# Patient Record
Sex: FEMALE | Race: WHITE | Employment: FULL TIME | URBAN - METROPOLITAN AREA
[De-identification: names, ages, dates, MRNs, and addresses within clinical notes are randomized per-mention and may not be internally consistent; named-entity substitution may affect disease eponyms.]

---

## 2021-10-06 ENCOUNTER — OFFICE VISIT (OUTPATIENT)
Dept: URGENT CARE | Facility: URGENT CARE | Age: 51
End: 2021-10-06
Payer: COMMERCIAL

## 2021-10-06 ENCOUNTER — ANCILLARY PROCEDURE (OUTPATIENT)
Dept: GENERAL RADIOLOGY | Facility: CLINIC | Age: 51
End: 2021-10-06
Attending: PHYSICIAN ASSISTANT
Payer: COMMERCIAL

## 2021-10-06 VITALS
OXYGEN SATURATION: 100 % | HEART RATE: 68 BPM | RESPIRATION RATE: 20 BRPM | DIASTOLIC BLOOD PRESSURE: 62 MMHG | TEMPERATURE: 98.4 F | SYSTOLIC BLOOD PRESSURE: 116 MMHG | WEIGHT: 145 LBS

## 2021-10-06 DIAGNOSIS — M79.674 PAIN OF TOE OF RIGHT FOOT: ICD-10-CM

## 2021-10-06 DIAGNOSIS — S92.514A CLOSED NONDISPLACED FRACTURE OF PROXIMAL PHALANX OF LESSER TOE OF RIGHT FOOT, INITIAL ENCOUNTER: Primary | ICD-10-CM

## 2021-10-06 PROCEDURE — 99204 OFFICE O/P NEW MOD 45 MIN: CPT | Performed by: PHYSICIAN ASSISTANT

## 2021-10-06 PROCEDURE — 73660 X-RAY EXAM OF TOE(S): CPT | Mod: RT | Performed by: RADIOLOGY

## 2021-10-06 RX ORDER — BUPROPION HYDROCHLORIDE 300 MG/1
300 TABLET ORAL
COMMUNITY
Start: 2021-06-02

## 2021-10-06 RX ORDER — GABAPENTIN 600 MG/1
600 TABLET ORAL
COMMUNITY
Start: 2021-09-16

## 2021-10-06 RX ORDER — GABAPENTIN 100 MG/1
CAPSULE ORAL
COMMUNITY
Start: 2021-09-20

## 2021-10-06 RX ORDER — ZOLPIDEM TARTRATE 5 MG/1
TABLET ORAL
COMMUNITY
Start: 2021-08-19

## 2021-10-06 RX ORDER — HYDROXYZINE HYDROCHLORIDE 10 MG/1
10 TABLET, FILM COATED ORAL EVERY 8 HOURS PRN
COMMUNITY
Start: 2020-05-11

## 2021-10-06 RX ORDER — ESCITALOPRAM OXALATE 20 MG/1
20 TABLET ORAL
COMMUNITY
Start: 2021-06-02

## 2021-10-07 NOTE — PATIENT INSTRUCTIONS
Patient Education     Closed Toe Fracture  Your toe is broken (fractured). This causes pain, swelling, and sometimes bruising. This injury usually takes about 4 to 6 weeks to heal, but can sometimes take longer. Toe injuries are often treated by taping the injured toe to the next one (buddy taping). Or you may have a hard shoe, splint, or cast. These protect the injured toe and hold it in position.   If the toenail has been severely injured, it may fall off in 1 to 2 weeks. It takes up to 12 months for a new toenail to grow back.   Home care  Follow these guidelines when caring for yourself at home:    You may be given a cast shoe to wear to keep your toe from moving. If not, you can use a sandal or any shoe that doesn t put pressure on the injured toe until the swelling and pain go away. If using a sandal, be careful not to strike your foot against anything. Another injury could make the fracture worse. If you were given crutches, don t put full weight on the injured foot until you can do so without pain, or as directed by your healthcare provider.    Keep your foot elevated to reduce pain and swelling. When sleeping, put a pillow under the injured leg. When sitting, support the injured leg so it is above your waist. This is very important during the first 2 days (48 hours).    Put an ice pack on the injured area. Do this for 20 minutes every 1 to 2 hours the first day for pain relief. You can make an ice pack by wrapping a plastic bag of ice cubes in a thin towel. As the ice melts, be careful that any cloth or paper tape doesn t get wet. Continue using the ice pack 3 to 4 times a day for the next 2 days. Then use the ice pack as needed to ease pain and swelling.    If buddy tape was used and it becomes wet or dirty, change it. You may replace it with paper, plastic, or cloth tape. Cloth tape and paper tapes must be kept dry.    You may use acetaminophen or ibuprofen to control pain, unless another pain medicine  was prescribed. If you have chronic liver or kidney disease, talk with your healthcare provider before using these medicines. Also talk with your provider if you ve had a stomach ulcer or gastrointestinal bleeding.    You may return to sports or physical education activities after 4 weeks when you can run without pain, or as directed by your healthcare provider.  Follow-up care  Follow up with your healthcare provider or as advised. This is to make sure the bone is healing the way it should.   X-rays may be taken. You will be told of any new findings that may affect your care.  When to seek medical advice  Call your healthcare provider right away if any of these occur:    Pain or swelling gets worse    The cast/splint cracks    The cast and padding get wet and stays wet more than 24 hours    Bad odor from the cast/splint or wound fluid stains the cast    Tightness or pressure under the cast/splint gets worse    Toe becomes cold, blue, numb, or tingly    You can t move the toe    Signs of infection: fever, redness, warmth, swelling, or drainage from the wound or cast    Fever of 100.4 F (38 C) or higher, or as directed by your healthcare provider    Diamond Fournier last reviewed this educational content on 2/1/2020 2000-2021 [employer]. All rights reserved. This information is not intended as a substitute for professional medical care. Always follow your healthcare professional's instructions.            no

## 2021-10-12 ENCOUNTER — OFFICE VISIT (OUTPATIENT)
Dept: PODIATRY | Facility: CLINIC | Age: 51
End: 2021-10-12
Attending: PHYSICIAN ASSISTANT
Payer: COMMERCIAL

## 2021-10-12 VITALS — WEIGHT: 145 LBS

## 2021-10-12 DIAGNOSIS — M79.671 ACUTE PAIN OF RIGHT FOOT: Primary | ICD-10-CM

## 2021-10-12 DIAGNOSIS — S92.514A CLOSED NONDISPLACED FRACTURE OF PROXIMAL PHALANX OF LESSER TOE OF RIGHT FOOT, INITIAL ENCOUNTER: ICD-10-CM

## 2021-10-12 PROCEDURE — 99243 OFF/OP CNSLTJ NEW/EST LOW 30: CPT | Performed by: PODIATRIST

## 2021-10-12 NOTE — PROGRESS NOTES
PATIENT HISTORY:  Sandeep Lockett PA-C requested I see this patient for their foot issue.  Esther Fabian is a 51 year old female who presents to clinic for pain to the right third toe.  Patient notes about a week ago was running and accidentally hit her foot into a wall.  She had instant pain.  Was seen in urgent care and had x-rays.  She was told she had a fracture.  They have been taking it to the point told.  Pain at its worst can be 5 out of 10.  She has been doing Tylenol resting and elevating which has been helping.  If there is too much bending at the toe that is what is the most painful.  She is wondering if she needs surgery or what else can be done for the toe fracture.    Review of Systems:  Patient denies fever, chills, rash, wound, stiffness,  numbness, weakness, heart burn, blood in stool, chest pain with activity, calf pain when walking, shortness of breath with activity, chronic cough, easy bleeding/bruising, swelling of ankles, excessive thirst, fatigue, depression, anxiety.  Patient admits to limping at times.     PAST MEDICAL HISTORY: No past medical history on file.     PAST SURGICAL HISTORY: No past surgical history on file.     MEDICATIONS:   Current Outpatient Medications:      buPROPion (WELLBUTRIN XL) 300 MG 24 hr tablet, Take 300 mg by mouth, Disp: , Rfl:      diclofenac (VOLTAREN) 50 MG EC tablet, Take 50 mg by mouth (Patient not taking: Reported on 10/6/2021), Disp: , Rfl:      escitalopram (LEXAPRO) 20 MG tablet, Take 20 mg by mouth, Disp: , Rfl:      gabapentin (NEURONTIN) 100 MG capsule, PLEASE SEE ATTACHED FOR DETAILED DIRECTIONS, Disp: , Rfl:      gabapentin (NEURONTIN) 600 MG tablet, Take 600 mg by mouth, Disp: , Rfl:      hydrOXYzine (ATARAX) 10 MG tablet, Take 10 mg by mouth every 8 hours as needed , Disp: , Rfl:      zolpidem (AMBIEN) 5 MG tablet, TAKE 1 TABLET AT BEDTIME THE NIGHT OF YOUR SLEEP STUDY (Patient not taking: Reported on 10/6/2021), Disp: , Rfl:      ALLERGIES:  No  Known Allergies     SOCIAL HISTORY:   Social History     Socioeconomic History     Marital status: Single     Spouse name: Not on file     Number of children: Not on file     Years of education: Not on file     Highest education level: Not on file   Occupational History     Not on file   Tobacco Use     Smoking status: Never Smoker     Smokeless tobacco: Never Used   Substance and Sexual Activity     Alcohol use: Yes     Drug use: Never     Sexual activity: Not on file   Other Topics Concern     Not on file   Social History Narrative     Not on file     Social Determinants of Health     Financial Resource Strain:      Difficulty of Paying Living Expenses:    Food Insecurity:      Worried About Running Out of Food in the Last Year:      Ran Out of Food in the Last Year:    Transportation Needs:      Lack of Transportation (Medical):      Lack of Transportation (Non-Medical):    Physical Activity:      Days of Exercise per Week:      Minutes of Exercise per Session:    Stress:      Feeling of Stress :    Social Connections:      Frequency of Communication with Friends and Family:      Frequency of Social Gatherings with Friends and Family:      Attends Christian Services:      Active Member of Clubs or Organizations:      Attends Club or Organization Meetings:      Marital Status:    Intimate Partner Violence:      Fear of Current or Ex-Partner:      Emotionally Abused:      Physically Abused:      Sexually Abused:         FAMILY HISTORY: No family history on file.     EXAM:Vitals: Wt 65.8 kg (145 lb)       General appearance: Patient is alert and fully cooperative with history & exam.  No sign of distress is noted during the visit.     Psychiatric: Affect is pleasant & appropriate.  Patient appears motivated to improve health.     Respiratory: Breathing is regular & unlabored while sitting.     HEENT: Hearing is intact to spoken word.  Speech is clear.  No gross evidence of visual impairment that would impact  ambulation.     Dermatologic: Skin is intact to both lower extremities without significant lesions, rash or abrasion.  No paronychia or evidence of soft tissue infection is noted.     Vascular: DP & PT pulses are intact & regular bilaterally.  No significant edema or varicosities noted.  CFT and skin temperature is normal to both lower extremities.     Neurologic: Lower extremity sensation is intact to light touch.  No evidence of weakness or contracture in the lower extremities.  No evidence of neuropathy.     Musculoskeletal: Patient is ambulatory without assistive device or brace.  Edema right third toe.  There is some lateral deviation of the toe noted.  No prominent bone or areas of skin concerns from the fracture noted    Radiographs: right foot xray -  I personally reviewed the xrays -  Oblique nondisplaced fracture proximal aspect right third toe proximal phalanx, seen best on the oblique view. Early hallux valgus deformity with bipartite hallux tibial sesamoid and mild great toe MTP joint osteoarthritis. No joint  malalignment.     ASSESSMENT:    Closed nondisplaced fracture of proximal phalanx of lesser toe of right foot, initial encounter  Acute pain of right foot       Medical Decision Making/Plan:  Reviewed patient's chart in [facility].  Reviewed and discussed x-rays with patient. Talked about fractures. Discussed that healing can take 6-10 weeks. Risk that the fracture will not heal and we may need to do surgery. Risk is increased 10-15% if you smoke.     Overall the fracture is fairly well aligned.  There is some shortening of the bone impaction and some minimal lateral deviation.  Not sure if surgery would help much at this point.  I recommend that she gary tape the third toe to her first and second toe for the next 4 weeks to help with realignment of that toe.  We will also recommend a short Aircast boot to help with the toes and.  She will wear that at all times when walking.  We will have her  follow-up in 1 month for gail-ray.  All questions were answered to patient's satisfaction she will call further questions or concerns.    Patient risk factor: Patient is at low risk for infection.        Rachel Villegas DPM, Podiatry/Foot and Ankle Surgery

## 2021-10-12 NOTE — PATIENT INSTRUCTIONS
Thank you for choosing [facility] Podiatry / Foot & Ankle Surgery!    DR. BLUNT'S CLINIC:  [facility]   [id number]   [place] [address]   [phone]  [phone]      SCHEDULE SURGERY: [phone]   BILLING QUESTIONS: [phone]   APPOINTMENTS: [phone]   CONSUMER PRICE LINE: [phone]      Follow up: 4 weeks    Next steps: walking boot      AIRCAST / CAM WALKING BOOT INSTRUCTIONS  - Do NOT drive with CAM walker on. This is due to safety and legal issues.   - Do NOT wear the CAM walker on long car/train rides or on an airplane.  - Remove the CAM walker several times a day and do ankle range of motion (ROM) exercises/wiggle toes.  - It is recommended that a thick-soled shoe be worn on the other foot to offset any created leg length issue.   - The boot does not have to be worn at night.   - There is an increased risk of developing a blood clot with lower extremity immobilization. ROM exercises and knee-high compression (tenso /ACE wrap) is recommended to lower that risk.   - You should seek medical attention if you experience calf swelling and/or pain, chest pain, or shortness of breath.     If you need to return or exchange the boot, please contact [facility] @ [phone]    TOE & METATARSAL FRACTURE   The structure of the foot is complex, consisting of bones, muscles, tendons, and other soft tissues. Of the 26 bones in the foot, 19 are toe bones (phalanges) and metatarsal bones (the long bones in the midfoot). Fractures of the toe and metatarsal bones are common and require evaluation by a specialist. A foot and ankle surgeon should be seen for proper diagnosis and treatment, even if initial treatment has been received in an emergency room.  A fracture is a break in the bone. Fractures can be divided into two categories: traumatic fractures and stress fractures.  Traumatic fractures (also called acute fractures) are caused by a direct blow or  impact, such as seriously stubbing your toe. Traumatic fractures can be displaced or non-displaced. If the fracture is displaced, the bone is broken in such a way that it has changed in position (dislocated).  Signs and symptoms of a traumatic fracture include:  You may hear a sound at the time of the break.    Pinpoint pain  (pain at the place of impact) at the time the fracture occurs and perhaps for a few hours later, but often the pain goes away after several hours.   Crooked or abnormal appearance of the toe.   Bruising and swelling the next day.   It is not true that  if you can walk on it, it s not broken.  Evaluation by a foot and ankle surgeon is always recommended.   Stress fractures are tiny, hairline breaks that are usually caused by repetitive stress. Stress fractures often afflict athletes who, for example, too rapidly increase their running mileage. They can also be caused by an abnormal foot structure, deformities, or osteoporosis. Improper footwear may also lead to stress fractures. Stress fractures should not be ignored. They require proper medical attention to heal correctly.  Symptoms of stress fractures include:  Pain with or after normal activity   Pain that goes away when resting and then returns when standing or during activity    Pinpoint pain  (pain at the site of the fracture) when touched   Swelling, but no bruising   Consequences of Improper Treatment  Some people say that  the doctor can t do anything for a broken bone in the foot.  This is usually not true. In fact, if a fractured toe or metatarsal bone is not treated correctly, serious complications may develop. For example:  A deformity in the bony architecture which may limit the ability to move the foot or cause difficulty in fitting shoes   Arthritis, which may be caused by a fracture in a joint (the juncture where two bones meet), or may be a result of angular deformities that develop when a displaced fracture is severe or hasn t  been properly corrected   Chronic pain and deformity   Non-union, or failure to heal, can lead to subsequent surgery or chronic pain.   TREATMENT: Toe Fractures  Fractures of the toe bones are almost always traumatic fractures. Treatment for traumatic fractures depends on the break itself and may include these options:  Rest. Sometimes rest is all that is needed to treat a traumatic fracture of the toe.   Splinting. The toe may be fitted with a splint to keep it in a fixed position.   Rigid or stiff-soled shoe. Wearing a stiff-soled shoe protects the toe and helps keep it properly positioned.    Brandon taping  the fractured toe to another toe is sometimes appropriate, but in other cases it may be harmful.   Surgery. If the break is badly displaced or if the joint is affected, surgery may be necessary. Surgery often involves the use of fixation devices, such as pins.   TREATMENT: Metatarsal Fractures  Breaks in the metatarsal bones may be either stress or traumatic fractures. Certain kinds of fractures of the metatarsal bones present unique challenges.  For example, sometimes a fracture of the first metatarsal bone (behind the big toe) can lead to arthritis. Since the big toe is used so frequently and bears more weight than other toes, arthritis in that area can make it painful to walk, bend, or even stand.  Another type of break, called a Martin fracture, occurs at the base of the fifth metatarsal bone (behind the little toe). It is often misdiagnosed as an ankle sprain, and misdiagnosis can have serious consequences since sprains and fractures require different treatments. Your foot and ankle surgeon is an expert in correctly identifying these conditions as well as other problems of the foot.  Treatment of metatarsal fractures depends on the type and extent of the fracture, and may include:  Rest. Sometimes rest is the only treatment needed to promote healing of a stress or traumatic fracture of a metatarsal bone.    Avoid the offending activity. Because stress fractures result from repetitive stress, it is important to avoid the activity that led to the fracture. Crutches or a wheelchair are sometimes required to offload weight from the foot to give it time to heal.   Immobilization, casting, or rigid shoe. A stiff-soled shoe or other form of immobilization may be used to protect the fractured bone while it is healing.   Surgery. Some traumatic fractures of the metatarsal bones require surgery, especially if the break is badly displaced.   Follow-up care. Your foot and ankle surgeon will provide instructions for care following surgical or non-surgical treatment. Physical therapy, exercises and rehabilitation may be included in a schedule for return to normal activities.

## 2021-10-12 NOTE — LETTER
10/12/2021         RE: Esther Fabian  [address] [facility] [phone]        Dear Colleague,    Thank you for referring your patient, Esther Fabian, to the [facility]OD[facility]. Please see a copy of my visit note below.    PATIENT HISTORY:  Sandeep Lockett PA-C requested I see this patient for their foot issue.  Esther Fabian is a 51 year old female who presents to clinic for pain to the right third toe.  Patient notes about a week ago was running and accidentally hit her foot into a wall.  She had instant pain.  Was seen in urgent care and had x-rays.  She was told she had a fracture.  They have been taking it to the point told.  Pain at its worst can be 5 out of 10.  She has been doing Tylenol resting and elevating which has been helping.  If there is too much bending at the toe that is what is the most painful.  She is wondering if she needs surgery or what else can be done for the toe fracture.    Review of Systems:  Patient denies fever, chills, rash, wound, stiffness,  numbness, weakness, heart burn, blood in stool, chest pain with activity, calf pain when walking, shortness of breath with activity, chronic cough, easy bleeding/bruising, swelling of ankles, excessive thirst, fatigue, depression, anxiety.  Patient admits to limping at times.     PAST MEDICAL HISTORY: No past medical history on file.     PAST SURGICAL HISTORY: No past surgical history on file.     MEDICATIONS:   Current Outpatient Medications:      buPROPion (WELLBUTRIN XL) 300 MG 24 hr tablet, Take 300 mg by mouth, Disp: , Rfl:      diclofenac (VOLTAREN) 50 MG EC tablet, Take 50 mg by mouth (Patient not taking: Reported on 10/6/2021), Disp: , Rfl:      escitalopram (LEXAPRO) 20 MG tablet, Take 20 mg by mouth, Disp: , Rfl:      gabapentin (NEURONTIN) 100 MG capsule, PLEASE SEE ATTACHED FOR DETAILED DIRECTIONS, Disp: , Rfl:      gabapentin (NEURONTIN) 600 MG tablet, Take 600 mg by mouth, Disp: , Rfl:       hydrOXYzine (ATARAX) 10 MG tablet, Take 10 mg by mouth every 8 hours as needed , Disp: , Rfl:      zolpidem (AMBIEN) 5 MG tablet, TAKE 1 TABLET AT BEDTIME THE NIGHT OF YOUR SLEEP STUDY (Patient not taking: Reported on 10/6/2021), Disp: , Rfl:      ALLERGIES:  No Known Allergies     SOCIAL HISTORY:   Social History     Socioeconomic History     Marital status: Single     Spouse name: Not on file     Number of children: Not on file     Years of education: Not on file     Highest education level: Not on file   Occupational History     Not on file   Tobacco Use     Smoking status: Never Smoker     Smokeless tobacco: Never Used   Substance and Sexual Activity     Alcohol use: Yes     Drug use: Never     Sexual activity: Not on file   Other Topics Concern     Not on file   Social History Narrative     Not on file     Social Determinants of Health     Financial Resource Strain:      Difficulty of Paying Living Expenses:    Food Insecurity:      Worried About Running Out of Food in the Last Year:      Ran Out of Food in the Last Year:    Transportation Needs:      Lack of Transportation (Medical):      Lack of Transportation (Non-Medical):    Physical Activity:      Days of Exercise per Week:      Minutes of Exercise per Session:    Stress:      Feeling of Stress :    Social Connections:      Frequency of Communication with Friends and Family:      Frequency of Social Gatherings with Friends and Family:      Attends Restorationism Services:      Active Member of Clubs or Organizations:      Attends Club or Organization Meetings:      Marital Status:    Intimate Partner Violence:      Fear of Current or Ex-Partner:      Emotionally Abused:      Physically Abused:      Sexually Abused:         FAMILY HISTORY: No family history on file.     EXAM:Vitals: Wt 65.8 kg (145 lb)       General appearance: Patient is alert and fully cooperative with history & exam.  No sign of distress is noted during the visit.     Psychiatric: Affect is  pleasant & appropriate.  Patient appears motivated to improve health.     Respiratory: Breathing is regular & unlabored while sitting.     HEENT: Hearing is intact to spoken word.  Speech is clear.  No gross evidence of visual impairment that would impact ambulation.     Dermatologic: Skin is intact to both lower extremities without significant lesions, rash or abrasion.  No paronychia or evidence of soft tissue infection is noted.     Vascular: DP & PT pulses are intact & regular bilaterally.  No significant edema or varicosities noted.  CFT and skin temperature is normal to both lower extremities.     Neurologic: Lower extremity sensation is intact to light touch.  No evidence of weakness or contracture in the lower extremities.  No evidence of neuropathy.     Musculoskeletal: Patient is ambulatory without assistive device or brace.  Edema right third toe.  There is some lateral deviation of the toe noted.  No prominent bone or areas of skin concerns from the fracture noted    Radiographs: right foot xray -  I personally reviewed the xrays -  Oblique nondisplaced fracture proximal aspect right third toe proximal phalanx, seen best on the oblique view. Early hallux valgus deformity with bipartite hallux tibial sesamoid and mild great toe MTP joint osteoarthritis. No joint  malalignment.     ASSESSMENT:    Closed nondisplaced fracture of proximal phalanx of lesser toe of right foot, initial encounter  Acute pain of right foot       Medical Decision Making/Plan:  Reviewed patient's chart in [facility].  Reviewed and discussed x-rays with patient. Talked about fractures. Discussed that healing can take 6-10 weeks. Risk that the fracture will not heal and we may need to do surgery. Risk is increased 10-15% if you smoke.     Overall the fracture is fairly well aligned.  There is some shortening of the bone impaction and some minimal lateral deviation.  Not sure if surgery would help much at this point.  I recommend that she  gary tape the third toe to her first and second toe for the next 4 weeks to help with realignment of that toe.  We will also recommend a short Aircast boot to help with the toes and.  She will wear that at all times when walking.  We will have her follow-up in 1 month for gail-ray.  All questions were answered to patient's satisfaction she will call further questions or concerns.    Patient risk factor: Patient is at low risk for infection.        Rachel Villegas DPM, Podiatry/Foot and Ankle Surgery        Again, thank you for allowing me to participate in the care of your patient.        Sincerely,        Rachel Villegas DPM, Podiatry/Foot and Ankle Surgery

## 2021-10-17 NOTE — PROGRESS NOTES
SUBJECTIVE:  Chief Complaint   Patient presents with     Musculoskeletal Problem     Right foot-third toe in painful since hitting a wall this afternoon     Esther Fabian is a 51 year old female presents with a chief complaint of right toe(s) great and third pain.  The injury occurred today.   The injury happened while at home while working (distance work). How: direct blowimmediate pain.  The patient complained of increasing pain  and has had decreased ROM.  Pain exacerbated by weight-bearing and movement.  Relieved by rest.   This is the first time this type of injury has occurred to this patient.     No past medical history on file.  Current Outpatient Medications   Medication Sig Dispense Refill     buPROPion (WELLBUTRIN XL) 300 MG 24 hr tablet Take 300 mg by mouth       escitalopram (LEXAPRO) 20 MG tablet Take 20 mg by mouth       gabapentin (NEURONTIN) 100 MG capsule PLEASE SEE ATTACHED FOR DETAILED DIRECTIONS       gabapentin (NEURONTIN) 600 MG tablet Take 600 mg by mouth       hydrOXYzine (ATARAX) 10 MG tablet Take 10 mg by mouth every 8 hours as needed        diclofenac (VOLTAREN) 50 MG EC tablet Take 50 mg by mouth (Patient not taking: Reported on 10/6/2021)       zolpidem (AMBIEN) 5 MG tablet TAKE 1 TABLET AT BEDTIME THE NIGHT OF YOUR SLEEP STUDY (Patient not taking: Reported on 10/6/2021)       Social History     Tobacco Use     Smoking status: Never Smoker     Smokeless tobacco: Never Used   Substance Use Topics     Alcohol use: Yes       ROS:  Review of systems negative except as stated above.    EXAM:   /62   Pulse 68   Temp 98.4  F (36.9  C)   Resp 20   Wt 65.8 kg (145 lb)   SpO2 100%   Gen: healthy,alert,no distress  Extremity: toe(s) great and third has swelling and pain with movment.   There is not compromise to the distal circulation.  Pulses are +2 and CRT is brisk  GENERAL APPEARANCE: healthy, alert and no distress  CHEST: clear to auscultation  CV: regular rate and  rhythm  EXTREMITIES: peripheral pulses normal  SKIN: no suspicious lesions or rashes  NEURO: Normal strength and tone, sensory exam grossly normal, mentation intact and speech normal    X-ray image initially interpreted in clinic by me indicating fracture without dislocation.  No evidence appreciated.      ASSESSMENT:   (S92.964A) Closed nondisplaced fracture of proximal phalanx of lesser toe of right foot, initial encounter  (primary encounter diagnosis)  Plan: Orthopedic  Referral  Post op shoe and gary tape    (R11.330) Pain of toe of right foot  Plan: XR Toe Right G/E 2 Views      Patient Instructions     Patient Education     Closed Toe Fracture  Your toe is broken (fractured). This causes pain, swelling, and sometimes bruising. This injury usually takes about 4 to 6 weeks to heal, but can sometimes take longer. Toe injuries are often treated by taping the injured toe to the next one (gary taping). Or you may have a hard shoe, splint, or cast. These protect the injured toe and hold it in position.   If the toenail has been severely injured, it may fall off in 1 to 2 weeks. It takes up to 12 months for a new toenail to grow back.   Home care  Follow these guidelines when caring for yourself at home:    You may be given a cast shoe to wear to keep your toe from moving. If not, you can use a sandal or any shoe that doesn t put pressure on the injured toe until the swelling and pain go away. If using a sandal, be careful not to strike your foot against anything. Another injury could make the fracture worse. If you were given crutches, don t put full weight on the injured foot until you can do so without pain, or as directed by your healthcare provider.    Keep your foot elevated to reduce pain and swelling. When sleeping, put a pillow under the injured leg. When sitting, support the injured leg so it is above your waist. This is very important during the first 2 days (48 hours).    Put an ice pack on the  injured area. Do this for 20 minutes every 1 to 2 hours the first day for pain relief. You can make an ice pack by wrapping a plastic bag of ice cubes in a thin towel. As the ice melts, be careful that any cloth or paper tape doesn t get wet. Continue using the ice pack 3 to 4 times a day for the next 2 days. Then use the ice pack as needed to ease pain and swelling.    If buddy tape was used and it becomes wet or dirty, change it. You may replace it with paper, plastic, or cloth tape. Cloth tape and paper tapes must be kept dry.    You may use acetaminophen or ibuprofen to control pain, unless another pain medicine was prescribed. If you have chronic liver or kidney disease, talk with your healthcare provider before using these medicines. Also talk with your provider if you ve had a stomach ulcer or gastrointestinal bleeding.    You may return to sports or physical education activities after 4 weeks when you can run without pain, or as directed by your healthcare provider.  Follow-up care  Follow up with your healthcare provider or as advised. This is to make sure the bone is healing the way it should.   X-rays may be taken. You will be told of any new findings that may affect your care.  When to seek medical advice  Call your healthcare provider right away if any of these occur:    Pain or swelling gets worse    The cast/splint cracks    The cast and padding get wet and stays wet more than 24 hours    Bad odor from the cast/splint or wound fluid stains the cast    Tightness or pressure under the cast/splint gets worse    Toe becomes cold, blue, numb, or tingly    You can t move the toe    Signs of infection: fever, redness, warmth, swelling, or drainage from the wound or cast    Fever of 100.4 F (38 C) or higher, or as directed by your healthcare provider    Shaking chills  [employer] last reviewed this educational content on 2/1/2020 2000-2021 [employer]. All rights reserved. This information is  not intended as a substitute for professional medical care. Always follow your healthcare professional's instructions.

## 2021-11-10 ENCOUNTER — OFFICE VISIT (OUTPATIENT)
Dept: PODIATRY | Facility: CLINIC | Age: 51
End: 2021-11-10
Payer: COMMERCIAL

## 2021-11-10 ENCOUNTER — ANCILLARY PROCEDURE (OUTPATIENT)
Dept: GENERAL RADIOLOGY | Facility: CLINIC | Age: 51
End: 2021-11-10
Attending: PODIATRIST
Payer: COMMERCIAL

## 2021-11-10 VITALS
DIASTOLIC BLOOD PRESSURE: 66 MMHG | BODY MASS INDEX: 23.54 KG/M2 | HEIGHT: 67 IN | SYSTOLIC BLOOD PRESSURE: 110 MMHG | WEIGHT: 150 LBS

## 2021-11-10 DIAGNOSIS — S92.501D: ICD-10-CM

## 2021-11-10 DIAGNOSIS — M79.671 ACUTE FOOT PAIN, RIGHT: Primary | ICD-10-CM

## 2021-11-10 DIAGNOSIS — M79.671 ACUTE FOOT PAIN, RIGHT: ICD-10-CM

## 2021-11-10 PROCEDURE — 99213 OFFICE O/P EST LOW 20 MIN: CPT | Performed by: PODIATRIST

## 2021-11-10 PROCEDURE — 73630 X-RAY EXAM OF FOOT: CPT | Mod: RT | Performed by: RADIOLOGY

## 2021-11-10 ASSESSMENT — MIFFLIN-ST. JEOR: SCORE: 1333.03

## 2021-11-10 NOTE — PROGRESS NOTES
"Podiatry / Foot and Ankle Surgery Progress Note    November 10, 2021    Subject: Patient was seen for follow-up fracture of the right great toe.  She has been gary taping it.  Has been wearing stiff soled sandals.  Notes that pain is much improved.  She does have throbbing now and again but it is not too bad.  Usually 2 out of 10 at its worst.  Wondering if she needs to continue gary taping.    Objective:  Vitals: /66   Ht 1.702 m (5' 7\")   Wt 68 kg (150 lb)   BMI 23.49 kg/m    BMI= Body mass index is 23.49 kg/m .    General:  Patient is alert and orientated.  NAD.    Dermatologic: Skin is intact to both lower extremities without significant lesions, rash or abrasion.  No paronychia or evidence of soft tissue infection is noted.     Vascular: DP & PT pulses are intact & regular bilaterally.  No significant edema or varicosities noted.  CFT and skin temperature is normal to both lower extremities.     Neurologic: Lower extremity sensation is intact to light touch.  No evidence of weakness or contracture in the lower extremities.  No evidence of neuropathy.     Musculoskeletal: Patient is ambulatory without assistive device or brace.  Edema right third toe.  There is no further lateral deviation of the toe noted.  No prominent bone or areas of skin concerns from the fracture noted     Radiographs: right foot xray -  I personally reviewed the xrays -  Oblique nondisplaced fracture proximal aspect right third toe proximal phalanx, seen best on the oblique view.  Malalignment has been corrected and there is some bony consolidation noted across the fracture site.     ASSESSMENT:     Acute foot pain, right  Closed fracture of phalanx of right third toe with routine healing        Medical Decision Making/Plan:  Reviewed patient's chart in [facility].  Reviewed and discussed x-rays.  At this time there appears to be healing across the fracture site the toe is noted to be straightened.  At this time she can go back into " regular tennis shoes and start walking.  We talked about compression socks to help with swelling.  Recommend not bending the toe significantly for the next 3 to 4 weeks just as that may cause pain but she can start to build up strength of her flexor tendon by curling her toe around the pencil or finger or using a washcloth to try to  her toes with.  We will have her follow-up as needed.  All questions were answered to patient's satisfaction she will call further questions or concerns.     Patient risk factor: Patient is at low risk for infection.     Rachel Villegas DPM, Podiatry/Foot and Ankle Surgery

## 2021-11-10 NOTE — PATIENT INSTRUCTIONS
Thank you for choosing [facility] Podiatry / Foot & Ankle Surgery!    DR. BLUNT'S CLINIC:  [facility]   [id number]   [place] [address]   [phone]  [phone]      SCHEDULE SURGERY: [phone]   BILLING QUESTIONS: [phone]   APPOINTMENTS: [phone]   RADIOLOGY: [phone]   CONSUMER PRICE LINE: [phone]      Follow up: as needed    Next steps: Recommend holding off on significant bending of the toe such as yoga or tippy toes for the next 3 to 4 weeks.  After that can try doing that.  Recommend stretching the toe over finger or pencil or a washcloth to help strengthen the flexor tendons.        Flu vaccines are now available at all [facility] clinics and retail pharmacies across the [facility]. Appointments are required for clinic locations. To schedule an appointment online, please log into [employer] or create an account if you are a new user. You can also call [phone], or simply walk in at one of the [facility] retail pharmacy locations.

## 2021-11-10 NOTE — LETTER
"    11/10/2021         RE: Esther Fabian  [address] [facility] [address]        Dear Colleague,    Thank you for referring your patient, Esther Fabian, to the [facility]. Please see a copy of my visit note below.    Podiatry / Foot and Ankle Surgery Progress Note    November 10, 2021    Subject: Patient was seen for follow-up fracture of the right great toe.  She has been gary taping it.  Has been wearing stiff soled sandals.  Notes that pain is much improved.  She does have throbbing now and again but it is not too bad.  Usually 2 out of 10 at its worst.  Wondering if she needs to continue gary taping.    Objective:  Vitals: /66   Ht 1.702 m (5' 7\")   Wt 68 kg (150 lb)   BMI 23.49 kg/m    BMI= Body mass index is 23.49 kg/m .    General:  Patient is alert and orientated.  NAD.    Dermatologic: Skin is intact to both lower extremities without significant lesions, rash or abrasion.  No paronychia or evidence of soft tissue infection is noted.     Vascular: DP & PT pulses are intact & regular bilaterally.  No significant edema or varicosities noted.  CFT and skin temperature is normal to both lower extremities.     Neurologic: Lower extremity sensation is intact to light touch.  No evidence of weakness or contracture in the lower extremities.  No evidence of neuropathy.     Musculoskeletal: Patient is ambulatory without assistive device or brace.  Edema right third toe.  There is no further lateral deviation of the toe noted.  No prominent bone or areas of skin concerns from the fracture noted     Radiographs: right foot xray -  I personally reviewed the xrays -  Oblique nondisplaced fracture proximal aspect right third toe proximal phalanx, seen best on the oblique view.  Malalignment has been corrected and there is some bony consolidation noted across the fracture site.     ASSESSMENT:     Acute foot pain, right  Closed fracture of phalanx of right third toe " with routine healing        Medical Decision Making/Plan:  Reviewed patient's chart in [facility].  Reviewed and discussed x-rays.  At this time there appears to be healing across the fracture site the toe is noted to be straightened.  At this time she can go back into regular tennis shoes and start walking.  We talked about compression socks to help with swelling.  Recommend not bending the toe significantly for the next 3 to 4 weeks just as that may cause pain but she can start to build up strength of her flexor tendon by curling her toe around the pencil or finger or using a washcloth to try to  her toes with.  We will have her follow-up as needed.  All questions were answered to patient's satisfaction she will call further questions or concerns.     Patient risk factor: Patient is at low risk for infection.     Rachel Villegas DPM, Podiatry/Foot and Ankle Surgery        Again, thank you for allowing me to participate in the care of your patient.        Sincerely,        Rachel Villegas DPM, Podiatry/Foot and Ankle Surgery